# Patient Record
Sex: FEMALE | Race: WHITE | NOT HISPANIC OR LATINO | Employment: UNEMPLOYED | URBAN - METROPOLITAN AREA
[De-identification: names, ages, dates, MRNs, and addresses within clinical notes are randomized per-mention and may not be internally consistent; named-entity substitution may affect disease eponyms.]

---

## 2017-01-10 ENCOUNTER — OFFICE VISIT (OUTPATIENT)
Dept: FAMILY MEDICINE CLINIC | Facility: CLINIC | Age: 27
End: 2017-01-10

## 2017-01-10 VITALS
DIASTOLIC BLOOD PRESSURE: 64 MMHG | BODY MASS INDEX: 21.73 KG/M2 | WEIGHT: 135.2 LBS | HEIGHT: 66 IN | OXYGEN SATURATION: 100 % | SYSTOLIC BLOOD PRESSURE: 104 MMHG | TEMPERATURE: 98.2 F | HEART RATE: 82 BPM

## 2017-01-10 DIAGNOSIS — J01.90 ACUTE SINUSITIS, RECURRENCE NOT SPECIFIED, UNSPECIFIED LOCATION: Primary | ICD-10-CM

## 2017-01-10 DIAGNOSIS — F41.9 ANXIETY: ICD-10-CM

## 2017-01-10 PROCEDURE — 99213 OFFICE O/P EST LOW 20 MIN: CPT | Performed by: PHYSICIAN ASSISTANT

## 2017-01-10 RX ORDER — AMOXICILLIN AND CLAVULANATE POTASSIUM 875; 125 MG/1; MG/1
1 TABLET, FILM COATED ORAL 2 TIMES DAILY
Qty: 20 TABLET | Refills: 1 | Status: SHIPPED | OUTPATIENT
Start: 2017-01-10 | End: 2021-11-10

## 2017-01-10 RX ORDER — CLONAZEPAM 0.5 MG/1
0.5 TABLET ORAL 3 TIMES DAILY PRN
Qty: 90 TABLET | Refills: 2
Start: 2017-01-10

## 2017-01-10 NOTE — MR AVS SNAPSHOT
Nisa Hill   1/10/2017 2:15 PM   Office Visit    Provider:  Katelynn Solano PA-C   Department:  [facility]   Dept Phone:  [phone]                Your Full Care Plan              Today's Medication Changes          These changes are accurate as of: 1/10/17  2:39 PM.  If you have any questions, ask your nurse or doctor.               New Medication(s)Ordered:     amoxicillin-clavulanate 875-125 MG per tablet   Commonly known as:  AUGMENTIN   Take 1 tablet by mouth 2 (Two) Times a Day. One PO BID for infection   Started by:  Katelynn Solano PA-C         Stop taking medication(s)listed here:     omeprazole 40 MG capsule   Commonly known as:  priLOSEC   Stopped by:  Katelynn Solano PA-C                Where to Get Your Medications      [place] [id number] AT [address] - [phone] [facility] [phone] [address] [address]     Phone:  [phone]     amoxicillin-clavulanate 875-125 MG per tablet                  Your Updated Medication List          This list is accurate as of: 1/10/17  2:39 PM.  Always use your most recent med list.                amoxicillin-clavulanate 875-125 MG per tablet   Commonly known as:  AUGMENTIN   Take 1 tablet by mouth 2 (Two) Times a Day. One PO BID for infection       clonazePAM 0.5 MG tablet   Commonly known as:  KlonoPIN   Take 1 tablet by mouth 3 (three) times a day as needed for anxiety.               You Were Diagnosed With        Codes Comments    Acute sinusitis, recurrence not specified, unspecified location    -  Primary ICD-10-CM: J01.90  ICD-9-CM: 461.9     Anxiety     ICD-10-CM: F41.9  ICD-9-CM: 300.00       Instructions    Step down PPI and try H2 blocker like Zantac or Axid  For throat pain:  Can gargle with 1/2 Maalox and 1/2 Benadryl liquid ---mix, gargle and spit Take Tylenol for fever or aches  Rest as  "needed  Call not better in 7 days  Increase fluids  Call if worse  Can use generic Afrin nasal spray up to 5 days for nasal congestion  Finish antibiotic course of therapy     Patient Instructions History      MyChart Signup     Our records indicate that you have an active [facility] account.    You can view your After Visit Summary by going to [email or web address] and logging in with your [facility] [employer] username and password.  If you don't have a [employer] username and password but a parent or guardian has access to your record, the parent or guardian should login with their own [employer] username and password and access your record to view the After Visit Summary.    If you have questions, you can email [email or web address] or call [phone] to talk to our [employer] staff.  Remember, [employer] is NOT to be used for urgent needs.  For medical emergencies, dial 911.               Other Info from Your Visit           Allergies     Levaquin [Levofloxacin]      Phenergan [Promethazine]        Reason for Visit     Med Refill     Cough     Nasal Congestion     URI           Vital Signs     Blood Pressure Pulse Temperature Height Weight    104/64 (BP Location: Left arm, Patient Position: Sitting, Cuff Size: Adult) 82 98.2 °F (36.8 °C) (Oral) 66\" (167.6 cm) 135 lb 3.2 oz (61.3 kg)    Last Menstrual Period Oxygen Saturation Body Mass Index Smoking Status       01/02/2017 100% 21.82 kg/m2 Former Smoker       Problems and Diagnoses Noted     Anxiety problem    Acute sinus infection    -  Primary      "

## 2017-01-10 NOTE — PATIENT INSTRUCTIONS
Step down PPI and try H2 blocker like Zantac or Axid  For throat pain:  Can gargle with 1/2 Maalox and 1/2 Benadryl liquid ---mix, gargle and spit Take Tylenol for fever or aches  Rest as needed  Call not better in 7 days  Increase fluids  Call if worse  Can use generic Afrin nasal spray up to 5 days for nasal congestion  Finish antibiotic course of therapy

## 2017-01-10 NOTE — PROGRESS NOTES
I have reviewed the notes, assessments, and/or procedures performed by Katelynn Solano PA-C, I concur with her/his documentation of Nisa Masker.

## 2017-01-10 NOTE — PROGRESS NOTES
Subjective   Nisa Hill is a 26 y.o. female.     History of Present Illness   Nisa Masker 26 y.o. female who presents for evaluation of sinus pressure and congestion, sore throat, cough, nosebleeds. Symptoms include ear pressure, post nasal drip, productive cough and runny nose.  Onset of symptoms was 2 weeks ago, gradually worsening since that time. Patient denies shortness of breath, wheezing.   Evaluation to date: [facility]. Treatment to date:  none.     Nisa Masker female 26 y.o. who presents today for follow up of Anxiety.  She reports medication is working well, patient desires to continue on Rx, and needs refill and this is only med she can tolerate.. Onset of symptoms was approximately several years ago.  She denies current suicidal and homicidal ideation. Risk factors are family history of anxiety and or depression and lifestyle of multiple roles.  Previous treatment includes current Rx.  She complains of the following medication side effects: none.  The patient is currently in counseling..  Patient denies having a history of substance abuse or addiction to alcohol.  She does however, have a family history of addiction with her uncle.  The following portions of the patient's history were reviewed and updated as appropriate: allergies, current medications, past family history, past medical history, past social history, past surgical history and problem list.    Review of Systems   Constitutional: Positive for fatigue. Negative for activity change and unexpected weight change.   HENT: Positive for congestion, postnasal drip, rhinorrhea, sinus pressure, sneezing and sore throat. Negative for ear pain.    Eyes: Negative for pain and discharge.   Respiratory: Positive for cough. Negative for chest tightness, shortness of breath and wheezing.    Cardiovascular: Negative for chest pain and palpitations.   Gastrointestinal: Positive for constipation. Negative for abdominal pain, diarrhea and vomiting.    Endocrine: Negative.    Genitourinary: Negative.    Musculoskeletal: Negative for joint swelling.   Skin: Negative for color change, rash and wound.   Allergic/Immunologic: Negative.    Neurological: Positive for dizziness, light-headedness and headaches. Negative for seizures and syncope.   Psychiatric/Behavioral: Negative.        Objective   Physical Exam   Constitutional: She is oriented to person, place, and time. She appears well-developed and well-nourished.  Non-toxic appearance. No distress.   HENT:   Head: Normocephalic and atraumatic. Hair is normal.   Right Ear: External ear normal. No drainage, swelling or tenderness. Tympanic membrane is retracted.   Left Ear: External ear normal. No drainage, swelling or tenderness. Tympanic membrane is retracted.   Nose: Mucosal edema present. No epistaxis.   Mouth/Throat: Uvula is midline and mucous membranes are normal. No oral lesions. No uvula swelling. Posterior oropharyngeal erythema present. No oropharyngeal exudate.   Eyes: Conjunctivae and EOM are normal. Pupils are equal, round, and reactive to light. Right eye exhibits no discharge. Left eye exhibits no discharge. No scleral icterus.   Neck: Normal range of motion. Neck supple.   Cardiovascular: Normal rate, regular rhythm and normal heart sounds.  Exam reveals no gallop.    No murmur heard.  Pulmonary/Chest: Breath sounds normal. No stridor. No respiratory distress. She has no wheezes. She has no rales. She exhibits no tenderness.   Abdominal: Soft. There is no tenderness.   Lymphadenopathy:     She has cervical adenopathy.   Neurological: She is alert and oriented to person, place, and time. She exhibits normal muscle tone.   Skin: Skin is warm and dry. No rash noted. She is not diaphoretic.   Psychiatric: She has a normal mood and affect. Her behavior is normal. Judgment and thought content normal.   Nursing note and vitals reviewed.      Assessment/Plan   Problems Addressed this Visit        Other     Anxiety      Other Visit Diagnoses     Acute sinusitis, recurrence not specified, unspecified location    -  Primary

## 2017-04-03 ENCOUNTER — TELEPHONE (OUTPATIENT)
Dept: FAMILY MEDICINE CLINIC | Facility: CLINIC | Age: 27
End: 2017-04-03

## 2017-04-03 RX ORDER — FLUCONAZOLE 150 MG/1
150 TABLET ORAL ONCE
Qty: 1 TABLET | Refills: 2 | Status: SHIPPED | OUTPATIENT
Start: 2017-04-03 | End: 2017-04-03

## 2017-04-03 NOTE — TELEPHONE ENCOUNTER
Pt seen you 1/10/17, she did not fill her antibiotic until recently. She now has a yeast infection with taking the medication. She would like to have a diflucan.

## 2017-04-04 NOTE — TELEPHONE ENCOUNTER
I called the number in the system and is is d/c then called a number that I got yesterday on my vm and no answer

## 2021-11-10 ENCOUNTER — OFFICE VISIT (OUTPATIENT)
Dept: OBSTETRICS AND GYNECOLOGY | Age: 31
End: 2021-11-10

## 2021-11-10 VITALS
SYSTOLIC BLOOD PRESSURE: 122 MMHG | BODY MASS INDEX: 23.46 KG/M2 | DIASTOLIC BLOOD PRESSURE: 76 MMHG | HEIGHT: 66 IN | WEIGHT: 146 LBS

## 2021-11-10 DIAGNOSIS — Z11.3 SCREEN FOR STD (SEXUALLY TRANSMITTED DISEASE): ICD-10-CM

## 2021-11-10 DIAGNOSIS — N92.6 IRREGULAR MENSES: ICD-10-CM

## 2021-11-10 DIAGNOSIS — Z11.51 SCREENING FOR HPV (HUMAN PAPILLOMAVIRUS): ICD-10-CM

## 2021-11-10 DIAGNOSIS — N92.0 MENORRHAGIA WITH REGULAR CYCLE: ICD-10-CM

## 2021-11-10 DIAGNOSIS — Z12.4 ENCOUNTER FOR PAPANICOLAOU SMEAR FOR CERVICAL CANCER SCREENING: ICD-10-CM

## 2021-11-10 DIAGNOSIS — Z01.419 WELL WOMAN EXAM WITH ROUTINE GYNECOLOGICAL EXAM: Primary | ICD-10-CM

## 2021-11-10 PROCEDURE — 3008F BODY MASS INDEX DOCD: CPT | Performed by: PHYSICIAN ASSISTANT

## 2021-11-10 PROCEDURE — 99385 PREV VISIT NEW AGE 18-39: CPT | Performed by: PHYSICIAN ASSISTANT

## 2021-11-10 RX ORDER — DIPHENOXYLATE HYDROCHLORIDE AND ATROPINE SULFATE 2.5; .025 MG/1; MG/1
TABLET ORAL DAILY
COMMUNITY

## 2021-11-10 NOTE — PROGRESS NOTES
"Subjective     Chief Complaint   Patient presents with   • Gynecologic Exam     new/re-establish care annual exam, has not been seen in 8 years.       History of Present Illness    Nisa Hill is a 31 y.o.  who presents for annual exam.    She is re establishing with us  Has not been seen since   Dr Hernández delivered her daughter     Was in [place] and started a period that was heavy and lasted for 14-15 days  Did take a plan b prior to this period as well  Does note a family h/o endometriosis and notes heavy bleeding  She does use otc products prn, which does help     Declines bcp  \"heard a lot of bad things\" about different options  I discussed benefits to bc and decreased bleeding, reduced risk of ovarian and uterine cancer as well  She declines these options  Will use otc products prn    She is , not preventing pregnancy but ok if she does get pregnant  Same FOB      Her menses are regular every 28-30 days, lasting 4-7 days, dysmenorrhea none   Obstetric History:  OB History        2    Para   1    Term   1            AB   1    Living   1       SAB   1    IAB        Ectopic        Molar        Multiple        Live Births   1               Menstrual History:     Patient's last menstrual period was 10/31/2021 (exact date).         Current contraception: natural family planning  History of abnormal Pap smear: no  Received Gardasil immunization: not sure  Perform regular self breast exam: yes - occl  Family history of uterine or ovarian cancer: no  Family History of colon cancer: yes - MGF diagnosed at 66 yoa  Family history of breast cancer: no    Mammogram: not indicated.  Colonoscopy: not indicated.  DEXA: not indicated.    Exercise: moderately active  Calcium/Vitamin D: adequate intake    The following portions of the patient's history were reviewed and updated as appropriate: allergies, current medications, past family history, past medical history, past social history, past " "surgical history and problem list.    Review of Systems   Genitourinary: Positive for menstrual problem (irregular and heavy cycle).   All other systems reviewed and are negative.      Review of Systems   Constitutional: Negative for fatigue.   Respiratory: Negative for shortness of breath.    Gastrointestinal: Negative for abdominal pain.   Genitourinary: Negative for dysuria.   Neurological: Negative for headaches.   Psychiatric/Behavioral: Negative for dysphoric mood.         Objective   Physical Exam    /76   Ht 167.6 cm (66\")   Wt 66.2 kg (146 lb)   LMP 10/31/2021 (Exact Date)   Breastfeeding No   BMI 23.57 kg/m²   General:   alert, comfortable and no distress   Heart: regular rate and rhythm   Lungs: clear to auscultation bilaterally   Breast: normal appearance, no masses or tenderness, Inspection negative, No nipple retraction or dimpling, No nipple discharge or bleeding, No axillary or supraclavicular adenopathy, Normal to palpation without dominant masses, positive findings: implants bilaterally   Neck: no adenopathy and no carotid bruit   Abdomen: {normal findings: soft, non-tender   CVA: Not performed today   Pelvis: External genitalia: normal general appearance  Vaginal: normal mucosa without prolapse or lesions  Cervix: normal appearance, thin prep PAP obtained and GC prep obtained  Adnexa: normal bimanual exam  Uterus: normal single, nontender   Extremities: Not performed today   Neurologic: negative   Psychiatric: Normal affect, judgement, and mood     Assessment/Plan   Diagnoses and all orders for this visit:    1. Well woman exam with routine gynecological exam (Primary)    2. Menorrhagia with regular cycle    3. Irregular menses  -     Cancel: CBC & Differential  -     Cancel: TSH  -     TSH  -     CBC & Differential    4. Encounter for Papanicolaou smear for cervical cancer screening  -     IGP, Aptima HPV, Rfx 16 / 18,45    5. Screening for HPV (human papillomavirus)  -     IGP, Aptima " HPV, Rfx 16 / 18,45    6. Screen for STD (sexually transmitted disease)  -     RPR, Rfx Qn RPR / Confirm TP  -     Hepatitis B Surface Antigen  -     Hepatitis C Antibody  -     HIV-1 / O / 2 Ag / Antibody 4th Generation        All questions answered.  Breast self exam technique reviewed and patient encouraged to perform self-exam monthly.  Discussed healthy lifestyle modifications.  Recommended 30 minutes of aerobic exercise five times per week.  Discussed calcium needs to prevent osteoporosis.    Pap obtained along with std testing at pt request  Labs also ordered, tsh, cbc and std panel again at pt's request  Disc endometriosis and limitations of diagnosis-gold standard being a dxistic lap  Would encourage her to schedule pelvic u/s for further w/u  Offered bcp but pt declines. Disc benefits to pills/iud at decreasing endometrial implants and reducing pain and bleeding. She declines but will let us know if she changes her mind

## 2021-11-11 LAB
BASOPHILS # BLD AUTO: 0 X10E3/UL (ref 0–0.2)
BASOPHILS NFR BLD AUTO: 1 %
EOSINOPHIL # BLD AUTO: 0.1 X10E3/UL (ref 0–0.4)
EOSINOPHIL NFR BLD AUTO: 2 %
ERYTHROCYTE [DISTWIDTH] IN BLOOD BY AUTOMATED COUNT: 11.8 % (ref 11.7–15.4)
HBV SURFACE AG SERPL QL IA: NEGATIVE
HCT VFR BLD AUTO: 39.4 % (ref 34–46.6)
HCV AB S/CO SERPL IA: <0.1 S/CO RATIO (ref 0–0.9)
HGB BLD-MCNC: 12.9 G/DL (ref 11.1–15.9)
HIV 1+2 AB+HIV1 P24 AG SERPL QL IA: NON REACTIVE
IMM GRANULOCYTES # BLD AUTO: 0 X10E3/UL (ref 0–0.1)
IMM GRANULOCYTES NFR BLD AUTO: 0 %
LYMPHOCYTES # BLD AUTO: 1.6 X10E3/UL (ref 0.7–3.1)
LYMPHOCYTES NFR BLD AUTO: 30 %
MCH RBC QN AUTO: 31 PG (ref 26.6–33)
MCHC RBC AUTO-ENTMCNC: 32.7 G/DL (ref 31.5–35.7)
MCV RBC AUTO: 95 FL (ref 79–97)
MONOCYTES # BLD AUTO: 0.4 X10E3/UL (ref 0.1–0.9)
MONOCYTES NFR BLD AUTO: 8 %
NEUTROPHILS # BLD AUTO: 3.1 X10E3/UL (ref 1.4–7)
NEUTROPHILS NFR BLD AUTO: 59 %
PLATELET # BLD AUTO: 276 X10E3/UL (ref 150–450)
RBC # BLD AUTO: 4.16 X10E6/UL (ref 3.77–5.28)
RPR SER QL: NON REACTIVE
TSH SERPL DL<=0.005 MIU/L-ACNC: 0.95 UIU/ML (ref 0.45–4.5)
WBC # BLD AUTO: 5.2 X10E3/UL (ref 3.4–10.8)

## 2021-11-17 PROBLEM — R87.618 PAP SMEAR ABNORMALITY OF CERVIX/HUMAN PAPILLOMAVIRUS (HPV) POSITIVE: Status: ACTIVE | Noted: 2021-11-17

## 2021-11-17 LAB
CYTOLOGIST CVX/VAG CYTO: ABNORMAL
CYTOLOGY CVX/VAG DOC CYTO: ABNORMAL
CYTOLOGY CVX/VAG DOC THIN PREP: ABNORMAL
DX ICD CODE: ABNORMAL
HIV 1 & 2 AB SER-IMP: ABNORMAL
HPV I/H RISK 4 DNA CVX QL PROBE+SIG AMP: POSITIVE
HPV16 DNA CVX QL PROBE+SIG AMP: NEGATIVE
HPV18+45 E6+E7 MRNA CVX QL NAA+PROBE: NEGATIVE
OTHER STN SPEC: ABNORMAL
STAT OF ADQ CVX/VAG CYTO-IMP: ABNORMAL

## 2022-07-20 ENCOUNTER — APPOINTMENT (OUTPATIENT)
Dept: GENERAL RADIOLOGY | Facility: HOSPITAL | Age: 32
End: 2022-07-20

## 2022-07-20 ENCOUNTER — APPOINTMENT (OUTPATIENT)
Dept: CT IMAGING | Facility: HOSPITAL | Age: 32
End: 2022-07-20

## 2022-07-20 ENCOUNTER — HOSPITAL ENCOUNTER (EMERGENCY)
Facility: HOSPITAL | Age: 32
Discharge: HOME | End: 2022-07-20
Attending: EMERGENCY MEDICINE | Admitting: EMERGENCY MEDICINE

## 2022-07-20 VITALS
DIASTOLIC BLOOD PRESSURE: 80 MMHG | OXYGEN SATURATION: 100 % | RESPIRATION RATE: 16 BRPM | HEART RATE: 90 BPM | TEMPERATURE: 99 F | SYSTOLIC BLOOD PRESSURE: 125 MMHG
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
## 2022-07-20 DIAGNOSIS — S29.011A MUSCLE STRAIN OF CHEST WALL, INITIAL ENCOUNTER: ICD-10-CM

## 2022-07-20 DIAGNOSIS — R68.84 JAW PAIN: ICD-10-CM

## 2022-07-20 DIAGNOSIS — S09.90XA CLOSED HEAD INJURY, INITIAL ENCOUNTER: ICD-10-CM

## 2022-07-20 LAB — QT INTERVAL: 416 MS

## 2022-07-20 PROCEDURE — 93005 ELECTROCARDIOGRAM TRACING: CPT | Performed by: NURSE PRACTITIONER

## 2022-07-20 PROCEDURE — 99283 EMERGENCY DEPT VISIT LOW MDM: CPT

## 2022-07-20 PROCEDURE — 63710000001 ONDANSETRON ODT 4 MG TABLET DISPERSIBLE: Performed by: NURSE PRACTITIONER

## 2022-07-20 PROCEDURE — 71046 X-RAY EXAM CHEST 2 VIEWS: CPT

## 2022-07-20 PROCEDURE — 93010 ELECTROCARDIOGRAM REPORT: CPT | Performed by: INTERNAL MEDICINE

## 2022-07-20 PROCEDURE — 70450 CT HEAD/BRAIN W/O DYE: CPT

## 2022-07-20 PROCEDURE — 70486 CT MAXILLOFACIAL W/O DYE: CPT

## 2022-07-20 RX ORDER — ONDANSETRON 4 MG/1
4 TABLET, ORALLY DISINTEGRATING ORAL ONCE
Status: COMPLETED | OUTPATIENT
Start: 2022-07-20 | End: 2022-07-20

## 2022-07-20 RX ORDER — HYDROCODONE BITARTRATE AND ACETAMINOPHEN 5; 325 MG/1; MG/1
1 TABLET ORAL ONCE
Status: COMPLETED | OUTPATIENT
Start: 2022-07-20 | End: 2022-07-20

## 2022-07-20 RX ADMIN — ONDANSETRON 4 MG: 4 TABLET, ORALLY DISINTEGRATING ORAL at 13:41

## 2022-07-20 RX ADMIN — HYDROCODONE BITARTRATE AND ACETAMINOPHEN 1 TABLET: 5; 325 TABLET ORAL at 13:41
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
## 2023-05-29 NOTE — DISCHARGE INSTRUCTIONS
Home to rest  Tylenol for pain  Ice to sore areas  Return if worse or new concerns   .Continue care with your primary care physician and have your blood pressure regularly checked and managed. Normal blood pressure is 120/80.        Nurse from [facility] called and wanted to verify - is patient taking one or two doses of 40mg Prilosec today?   I will call back nurse to inform once I hear from you.   Ashley Thompson LPN on 5/29/2023 at 11:09 AM